# Patient Record
Sex: FEMALE | Race: OTHER | URBAN - METROPOLITAN AREA
[De-identification: names, ages, dates, MRNs, and addresses within clinical notes are randomized per-mention and may not be internally consistent; named-entity substitution may affect disease eponyms.]

---

## 2017-10-30 ENCOUNTER — OFFICE VISIT (OUTPATIENT)
Dept: FAMILY MEDICINE CLINIC | Facility: CLINIC | Age: 27
End: 2017-10-30

## 2017-10-30 VITALS
BODY MASS INDEX: 27.8 KG/M2 | SYSTOLIC BLOOD PRESSURE: 101 MMHG | HEART RATE: 64 BPM | HEIGHT: 66 IN | DIASTOLIC BLOOD PRESSURE: 72 MMHG | WEIGHT: 173 LBS

## 2017-10-30 DIAGNOSIS — B86 SCABIES: Primary | ICD-10-CM

## 2017-10-30 PROCEDURE — 99213 OFFICE O/P EST LOW 20 MIN: CPT | Performed by: NURSE PRACTITIONER

## 2017-10-30 NOTE — PROGRESS NOTES
Rash   This is a new problem. The current episode started in the past 7 days. The problem has been gradually worsening since onset. The affected locations include the back. The rash is characterized by redness and itchiness.  Associated with: mother works a None on file         No current outpatient prescriptions on file. Allergies:  No Known Allergies    Physical Exam   Constitutional: She is oriented to person, place, and time. She appears well-developed and well-nourished.    HENT:   Head: Normocephalic

## 2017-10-30 NOTE — PATIENT INSTRUCTIONS
Scabies  Scabies is a skin infection. It is caused by a tiny parasitic insect, or mite, that is too small to see directly. It can be seen under a microscope, but it is usually recognized only by the rash and symptoms it causes.  This can make it hard to d · Use the cream on your body when your skin is cool and dry. Don’t use it after a hot shower or bath. · Usually the cream is put on your whole body. This means from your chin all the way down to your toes. Scabies does not usually affect an adult’s head. Follow up with your healthcare provider, or as advised. Call your provider if your symptoms don’t improve after 1 week, or if new burrows or rashes appear.   When to seek medical advice  Call your healthcare provider right away if any of these occur:  · Yel

## (undated) NOTE — LETTER
10/30/2017          To Whom It May Concern:    Yakov Frye is currently under my medical care and may not return to school at this time. Please excuse Terra for 1 days. She may return to work on October 31, 2017.   Activity is restricted as follows: no